# Patient Record
(demographics unavailable — no encounter records)

---

## 2017-07-27 NOTE — ERD
ER Documentation


Chief Complaint


Date/Time


DATE: 7/27/17 


TIME: 20:42


Chief Complaint


mouth sores started yesterday with diff eating (no appetite)





HPI


Patient is a 7-month-old female with no medical problems who presents with 

mouth sores.  The sore started 3 days ago and 2 days ago the father says the 

patient had fever.  They tried Motrin.  There was no call the primary doctor as 

of yet.  The patient has been feeding but is feeding less than normal per the 

dad.  There is no other rash.  There is no vomiting or diarrhea.





ROS


All systems reviewed and are negative except as per history of present illness.





Medications


Home Meds


Active Scripts


Acetaminophen* (Acetaminophen* Susp) 160 Mg/5 Ml Oral.susp, 2.5 ML PO Q8 Y for 

PAIN OR FEVER, #1 BOTTLE


   Prov:KATHY MOORE MD         7/27/17


Ibuprofen (Ibuprofen) 100 Mg/5 Ml Oral.susp, 2.5 ML PO Q8 Y for PAIN AND OR 

ELEVATED TEMP, #4 OZ


   Prov:KATHY MOORE MD         7/27/17





PMhx/Soc


Medical and Surgical Hx:  pt denies Medical Hx, pt denies Surgical Hx


Hx Alcohol Use:  No


Hx Substance Use:  No


Hx Tobacco Use:  No





FmHx


Family History:  No diabetes





Physical Exam


Vitals





Vital Signs








  Date Time  Temp Pulse Resp B/P Pulse Ox O2 Delivery O2 Flow Rate FiO2


 


7/27/17 19:21 98.1 134 24  97   








Physical Exam


Const: No acute distress


Head:   Atraumatic 


Eyes:    Normal Conjunctiva


ENT:    Stomatitis of the tongue without signs of oropharyngeal swelling or 

stridor, well-hydrated


Neck:               Full range of motion..~ No meningismus.


Resp:    Clear to auscultation bilaterally


Cardio:    Regular rate and rhythm, no murmurs


Abd:    Soft, non tender, non distended. Normal bowel sounds


Skin:    No petechiae or rashes


Back:    No midline or flank tenderness


Ext:    No cyanosis, or edema


Neur:    Awake and alert





Procedures/MDM


Patient is a 7-month-old who presents with what appears to be acute stomatitis 

of the tongue.  I believe that this is most likely from a viral illness 

especially with the recent fever.  I believe outpatient management is 

appropriate.  The patient is well hydrated, interactive, and looks well.  The 

patient will be discharged home and can follow-up with the pediatrician within 

24-48 hours for reevaluation.  Patient can return sooner for any worsening 

symptoms.





Departure


Diagnosis:  


 Primary Impression:  


 Stomatitis


Condition:  Fair


Patient Instructions:  Gingivo - Stomatitis (Child)


Referrals:  


Dr. Martinez





Additional Instructions:  


Llame al doctor MAANA y cleve danielle HSANA PARA DENTRO DE 1-2 NASH.Dgale a la 

secretaria que nosotros le instruimos hacer esta shana.Avise o llame si levine 

condicin se empeora antes de la shana. Regresa aqui si peor o no mejor.











KATHY MOORE MD Jul 27, 2017 20:43